# Patient Record
Sex: MALE | Race: WHITE | URBAN - METROPOLITAN AREA
[De-identification: names, ages, dates, MRNs, and addresses within clinical notes are randomized per-mention and may not be internally consistent; named-entity substitution may affect disease eponyms.]

---

## 2017-06-05 ENCOUNTER — HOSPITAL ENCOUNTER (OUTPATIENT)
Dept: CT IMAGING | Age: 42
Discharge: HOME | End: 2017-06-05
Attending: INTERNAL MEDICINE | Admitting: INTERNAL MEDICINE

## 2017-06-05 DIAGNOSIS — Z82.49 FAMILY HISTORY OF HEART DISEASE: ICD-10-CM

## 2018-01-30 ENCOUNTER — OFFICE VISIT (OUTPATIENT)
Dept: ORTHOPEDIC SURGERY | Age: 43
End: 2018-01-30

## 2018-01-30 VITALS
HEIGHT: 71 IN | HEART RATE: 64 BPM | SYSTOLIC BLOOD PRESSURE: 125 MMHG | DIASTOLIC BLOOD PRESSURE: 80 MMHG | WEIGHT: 270 LBS | BODY MASS INDEX: 37.8 KG/M2

## 2018-01-30 DIAGNOSIS — S43.432A SUPERIOR GLENOID LABRUM LESION OF LEFT SHOULDER, INITIAL ENCOUNTER: ICD-10-CM

## 2018-01-30 DIAGNOSIS — M25.512 ACUTE PAIN OF LEFT SHOULDER: Primary | ICD-10-CM

## 2018-01-30 PROCEDURE — 99203 OFFICE O/P NEW LOW 30 MIN: CPT | Performed by: ORTHOPAEDIC SURGERY

## 2018-01-30 RX ORDER — ATORVASTATIN CALCIUM 20 MG/1
20 TABLET, FILM COATED ORAL DAILY
COMMUNITY

## 2018-01-30 RX ORDER — MELOXICAM 15 MG/1
15 TABLET ORAL DAILY
Qty: 30 TABLET | Refills: 3 | Status: SHIPPED | OUTPATIENT
Start: 2018-01-30

## 2018-01-30 NOTE — PROGRESS NOTES
Chief Complaint    Shoulder Pain (left shoulder pain )      History of Present Illness:  Natalia Allen is a 38 y.o. male here today for left shoulder evaluation. He is a left hand dominant male and a former . He has been experiencing left shoulder pain since November 2017 while throwing a ball overhand. He was preparing for the [employer]. He just back from [facility] where the camp was held. While he was there he had pain in his left shoulder when throwing a ball longer than a soft toss. He was unable to pitch because of the pain. He describes an injury with similar pain when he was 23 throwing wiffle ball. In the intervening 20 years he had no pain. Pain Assessment  Location of Pain: Shoulder  Location Modifiers: Left  Severity of Pain: 5  Quality of Pain: Sharp  Duration of Pain: A few minutes  Frequency of Pain: Intermittent  Aggravating Factors:  (Throwing)  Limiting Behavior: Yes  Relieving Factors: Rest  Result of Injury: No  Work-Related Injury: No  Are there other pain locations you wish to document?: No    Medical History:    No notes on file    Patient's medications, allergies, past medical, surgical, social and family histories were reviewed and updated as appropriate. Past Medical History:   Diagnosis Date    Heart disease     High cholesterol       Social History     Social History    Marital status:      Spouse name: N/A    Number of children: N/A    Years of education: N/A     Occupational History    Not on file. Social History Main Topics    Smoking status: Never Smoker    Smokeless tobacco: Never Used    Alcohol use No    Drug use: No    Sexual activity: Not on file     Other Topics Concern    Not on file     Social History Narrative    No narrative on file       No Known Allergies  No current outpatient prescriptions on file prior to visit.      No current facility-administered medications on file prior to

## 2018-01-30 NOTE — LETTER
Shoulder Elbow Rehabilitation Referral    Patient Name: Marlena Florence      YOB: 1975    Diagnosis:   Visit Diagnoses       Codes    Acute pain of left shoulder    -  Primary ICD-10-CM: M25.512  ICD-9-CM: 719.41    Superior glenoid labrum lesion of left shoulder, initial encounter     ICD-10-CM: X42.393P  ICD-9-CM: 840.7          Post Op Instructions:   Continuous passive motion (CPM)   Elbow range of motion   Exercise in plane of scapula    Strengthening      Pulley and instruction    Home exercise program (copy to patient)    Sling when arm at risk   Sling or brace at all times    AAROM: Forward elevation to               AAROM: External rotation  To       Isometric external rotator strengthening  AAROM: internal rotation: up the back   Isometric abductor strengthening   AAROM: Internal abduction      Isometric internal rotator strengthening  AAROM: cross-body adduction             Stretching:     Strengthening:   Four quadrant (FE, ER, IR, CBA)   Sleeper stretch     Rotator cuff (ER, IR, Abd)   Forward Elevation     External Rotators      External Rotation     Internal Rotators   Internal Rotation: up/back    Abductors      Internal Rotation: supine in abduction  Flexors   Cross-body abduction     Extensors   Pendulum (FE, Abd/Add, cw/ccw)   Scapular Stabilizers    Wall-walking (FE, Abd)     Shoulder shrugs      Table slides       Rhomboid pinch   Elbow (flex, ext, pron, sup)     Lat.  Pull downs      Medial epicondylitis program    Forward punch    Lateral epicondylitis program    Internal rotators      Progressive resistive exercises   Bench Press         Bench press plus  Activities:      Lateral pull-downs   Rowing      Progressive two-hand supine press   Stepper/Exercise bike    Biceps: curls/supination   Swimming   Water exercises    Modalities: PRN    Return to Sport:   Ultrasound      Plyometrics   Iontophoresis     Rhythmic stabilization   Moist heat      Core strengthening Massage      Sports specific program: return to throw   Cryotherapy       Electrical stimulation      Paraffin   Whirlpool   TENS     Home exercise program (copy to patient). Supervised physical therapy  Frequency:   1x week   2x week   3x week   Other:   Duration:  2 weeks    4 weeks   6 weeks   Other:     Sincerely,              Valente Ayala MD  Clinical Fellow in [address] [place] [address]  1/30/2018     This dictation was performed with a verbal recognition program [facility]) and it was checked for errors. It is possible that there are still dictated errors within this office note. If so, please bring any errors to my attention for an addendum. All efforts were made to ensure that this office note is accurate.

## 2018-02-05 ENCOUNTER — EVALUATION (OUTPATIENT)
Dept: PHYSICAL THERAPY | Age: 43
End: 2018-02-05

## 2018-02-05 DIAGNOSIS — S43.432D SUPERIOR GLENOID LABRUM LESION OF LEFT SHOULDER, SUBSEQUENT ENCOUNTER: Primary | ICD-10-CM

## 2018-02-05 DIAGNOSIS — M25.512 PAIN IN JOINT OF LEFT SHOULDER: ICD-10-CM

## 2018-02-05 PROCEDURE — 97110 THERAPEUTIC EXERCISES: CPT | Performed by: PHYSICAL THERAPIST

## 2018-02-05 PROCEDURE — 97530 THERAPEUTIC ACTIVITIES: CPT | Performed by: PHYSICAL THERAPIST

## 2018-02-05 PROCEDURE — 97161 PT EVAL LOW COMPLEX 20 MIN: CPT | Performed by: PHYSICAL THERAPIST

## 2018-02-05 NOTE — FLOWSHEET NOTE
[facility]   Phone: [phone]    Fax: [phone]        Physical Therapy Daily Treatment Note  Date:  2018    Patient Name:  Ishaan Ma    :  1975  MRN: [id number]  Medical/Treatment Diagnosis Information:  · Diagnosis: L shoulder SLAP tear  ·    Insurance/Certification information:  PT Insurance Information: Humana  Physician Information:  Referring Practitioner: Dr. Cooper Goldmann of care signed (Y/N): sent 18    Date of Patient follow up with Physician: 18    G-Code (if applicable):      Date G-Code Applied:  18  PT G-Codes  Functional Assessment Tool Used: UEFI  Score: 77/80, 4% limitation  Functional Limitation: Carrying, moving and handling objects  Carrying, Moving and Handling Objects Current Status ():  At least 1 percent but less than 20 percent impaired, limited or restricted  Carrying, Moving and Handling Objects Goal Status (): 0 percent impaired, limited or restricted    Progress Note: [x]  Yes  []  No  Next due by: Visit #10      Latex Allergy:  [x]NO      []YES  Preferred Language for Healthcare:   [x]English       []other:    Visit # Insurance Allowable   1 20     Pain level:  0-7/10, 7/10 with pitching/overhand  throwing     SUBJECTIVE:  See eval    OBJECTIVE: See eval     ROM PROM AROM  Comment:  18     L R L R     Flexion     168°* 175°     Abduction     180°* 180°     ER     T4 T4     IR     T9 T10     Other             Other                    Strength L R Comment:  18   Flexion 5/5 5/5     Abduction 4+/5 5/5     ER 5/5 5/5     IR 5/5 5/5           Special Tests Results/Comment: 18   Mark (-)   Neers (+)   OBriens (-)   Other: Empty can (-)          RESTRICTIONS/PRECAUTIONS: possible L shoulder SLAP tear    Exercises:  Exercise/Equipment Resistance/Repetitions Comments   Cardio/Warm-up     Bike          Stretching/PROM     Wand     Wall Slides 10x10\"    UE Bellona     Pulleys     Pendulum

## 2018-02-05 NOTE — PLAN OF CARE
when throwing    Posture: forward head and rounded shoulders    Bandages/Dressings/Incisions: n/a    Gait: (include devices/WB status): WNL    Orthopedic Special Tests: see above                       [x] Patient history, allergies, meds reviewed. Medical chart reviewed. See intake form. Review Of Systems (ROS):  [x]Performed Review of systems (Integumentary, CardioPulmonary, Neurological) by intake and observation. Intake form has been scanned into medical record. Patient has been instructed to contact their primary care physician regarding ROS issues if not already being addressed at this time.       Co-morbidities/Complexities (which will affect course of rehabilitation):   [x]None           Arthritic conditions   []Rheumatoid arthritis (M05.9)  []Osteoarthritis (M19.91)   Cardiovascular conditions   []Hypertension (I10)  []Hyperlipidemia (E78.5)  []Angina pectoris (I20)  []Atherosclerosis (I70)   Musculoskeletal conditions   []Disc pathology   []Congenital spine pathologies   []Prior surgical intervention  []Osteoporosis (M81.8)  []Osteopenia (M85.8)   Endocrine conditions   []Hypothyroid (E03.9)  []Hyperthyroid Gastrointestinal conditions   []Constipation (N01.04)   Metabolic conditions   []Morbid obesity (E66.01)  []Diabetes type 1(E10.65) or 2 (E11.65)   []Neuropathy (G60.9)     Pulmonary conditions   []Asthma (J45)  []Coughing   []COPD (J44.9)   Psychological Disorders  []Anxiety (F41.9)  []Depression (F32.9)   []Other:   []Other:          Barriers to/and or personal factors that will affect rehab potential:              []Age  []Sex              [x]Motivation/Lack of Motivation: Patient demonstrates high motivation                        []Co-Morbidities              []Cognitive Function, education/learning barriers              []Environmental, home barriers              []profession/work barriers  [x]past PT/medical experience: Patient has had prior skilled PT experience and demonstrates good participation in social activities. []Reduced participation in sport/recreation activities. Classification:   []Signs/symptoms consistent with post-surgical status including decreased ROM, strength and function.   []Signs/symptoms consistent with joint sprain/strain   []Signs/symptoms consistent with shoulder impingement   []Signs/symptoms consistent with shoulder/elbow/wrist tendinopathy   []Signs/symptoms consistent with Rotator cuff tear   [x]Signs/symptoms consistent with labral tear   []Signs/symptoms consistent with postural dysfunction    []Signs/symptoms consistent with Glenohumeral IR Deficit - <45 degrees   []Signs/symptoms consistent with facet dysfunction of cervical/thoracic spine    []Signs/symptoms consistent with pathology which may benefit from Dry needling     []other:     Prognosis/Rehab Potential:      []Excellent   [x]Good    []Fair   []Poor    Tolerance of evaluation/treatment:    []Excellent   [x]Good    []Fair   []Poor    Physical Therapy Evaluation Complexity Justification  [x] A history of present problem with:  [] no personal factors and/or comorbidities that impact the plan of care;  [x]1-2 personal factors and/or comorbidities that impact the plan of care  []3 personal factors and/or comorbidities that impact the plan of care  [x] An examination of body systems using standardized tests and measures addressing any of the following: body structures and functions (impairments), activity limitations, and/or participation restrictions;:  [] a total of 1-2 or more elements   [x] a total of 3 or more elements   [] a total of 4 or more elements   [x] A clinical presentation with:  [x] stable and/or uncomplicated characteristics   [] evolving clinical presentation with changing characteristics  [] unstable and unpredictable characteristics;   [x] Clinical decision making of [x] low, [] moderate, [] high complexity using standardized patient assessment instrument and/or measurable assessment of functional outcome. [x] EVAL (LOW) 52149 (typically 20 minutes face-to-face)  [] EVAL (MOD) 44868 (typically 30 minutes face-to-face)  [] EVAL (HIGH) 91357 (typically 45 minutes face-to-face)  [] RE-EVAL     PLAN:  Frequency/Duration:  1-2 days per week for 4-6 Weeks:  INTERVENTIONS:  [x] Therapeutic exercise including: strength training, ROM, for Upper extremity and core   [x]  NMR activation and proprioception for UE, scap and Core   [x] Manual therapy as indicated for shoulder, scapula and spine to include: Dry Needling/IASTM, STM, PROM, Gr I-IV mobilizations, manipulation. [x] Modalities as needed that may include: thermal agents, E-stim, Biofeedback, US, iontophoresis as indicated  [x] Patient education on joint protection, postural re-education, activity modification, progression of HEP. HEP instruction: Patient returned proper demonstration of HEP. Issued patient written program clearly stating sets/reps/sessions per day. Written program was scanned into media section of medical record. (see scanned forms)    GOALS:  Patient stated goal: Able to throw baseball overhand without pain/instability in L shoulder. Therapist goals for Patient:   Short Term Goals: To be achieved in: 2 weeks  1. Independent in HEP and progression per patient tolerance, in order to prevent re-injury. 2. Patient will have a decrease in pain to facilitate improvement in movement, function, and ADLs as indicated by Functional Deficits. Long Term Goals: To be achieved in: 6 weeks  1. Disability index score of 0% for the UEFI to assist with reaching prior level of function. 2. Patient will demonstrate increased L shoulder flexion AROM to >170° to allow for proper joint functioning as indicated by patients Functional Deficits. 3. Patient will demonstrate an increase in L shoulder abduction strength to 5/5 to allow for proper functional mobility as indicated by patients Functional Deficits.    4. Patient will return to

## 2018-02-12 ENCOUNTER — TREATMENT (OUTPATIENT)
Dept: PHYSICAL THERAPY | Age: 43
End: 2018-02-12

## 2018-02-12 DIAGNOSIS — S43.432D SUPERIOR GLENOID LABRUM LESION OF LEFT SHOULDER, SUBSEQUENT ENCOUNTER: Primary | ICD-10-CM

## 2018-02-12 DIAGNOSIS — M25.512 PAIN IN JOINT OF LEFT SHOULDER: ICD-10-CM

## 2018-02-12 PROCEDURE — 97530 THERAPEUTIC ACTIVITIES: CPT | Performed by: PHYSICAL THERAPIST

## 2018-02-12 PROCEDURE — 97110 THERAPEUTIC EXERCISES: CPT | Performed by: PHYSICAL THERAPIST

## 2018-02-12 PROCEDURE — 97140 MANUAL THERAPY 1/> REGIONS: CPT | Performed by: PHYSICAL THERAPIST

## 2018-02-12 NOTE — FLOWSHEET NOTE
[facility]   Phone: [phone]    Fax: [phone]        Physical Therapy Daily Treatment Note  Date:  2018    Patient Name:  Paolo Frausto    :  1975  MRN: [id number]  Medical/Treatment Diagnosis Information:  · Diagnosis: L shoulder SLAP tear     Insurance/Certification information:  PT Insurance Information: Humana  Physician Information:  Referring Practitioner: Dr. He Murray of care signed (Y/N): yes 18    Date of Patient follow up with Physician: 18    G-Code (if applicable):      Date G-Code Applied:  18  PT G-Codes  Functional Assessment Tool Used: UEFI  Score: 77/80, 4% limitation  Functional Limitation: Carrying, moving and handling objects  Carrying, Moving and Handling Objects Current Status (): At least 1 percent but less than 20 percent impaired, limited or restricted  Carrying, Moving and Handling Objects Goal Status (): 0 percent impaired, limited or restricted    Progress Note: []  Yes  [x]  No  Next due by: Visit #10      Latex Allergy:  [x]NO      []YES  Preferred Language for Healthcare:   [x]English       []other:    Visit # Insurance Allowable   2 20     Pain level:  0/10     SUBJECTIVE:  Patient reports that he has avoided the throwing motion, and therefore has not had pain in the L shoulder since last session. He reports that he does have intermittent popping in the L shoulder. He notes compliance with HEP.     OBJECTIVE: See eval             ROM PROM AROM  Comment:  18     L R L R     Flexion     168°* 175°     Abduction     180°* 180°     ER     T4 T4     IR     T9 T10     Other             Other                    Strength L R Comment:  18   Flexion 5/5 5/5     Abduction 4+/5 5/5     ER 5/5 5/5     IR 5/5 5/5           Special Tests Results/Comment: 18   Mark (-)   Neers (+)   OBriens (-)   Other: Empty can (-)          RESTRICTIONS/PRECAUTIONS: possible L shoulder SLAP DPT

## 2018-02-19 ENCOUNTER — TREATMENT (OUTPATIENT)
Dept: PHYSICAL THERAPY | Age: 43
End: 2018-02-19

## 2018-02-19 DIAGNOSIS — M25.512 PAIN IN JOINT OF LEFT SHOULDER: ICD-10-CM

## 2018-02-19 DIAGNOSIS — S43.432D SUPERIOR GLENOID LABRUM LESION OF LEFT SHOULDER, SUBSEQUENT ENCOUNTER: Primary | ICD-10-CM

## 2018-02-19 PROCEDURE — 97140 MANUAL THERAPY 1/> REGIONS: CPT | Performed by: PHYSICAL THERAPIST

## 2018-02-19 PROCEDURE — 97112 NEUROMUSCULAR REEDUCATION: CPT | Performed by: PHYSICAL THERAPIST

## 2018-02-19 PROCEDURE — 97110 THERAPEUTIC EXERCISES: CPT | Performed by: PHYSICAL THERAPIST

## 2018-02-19 PROCEDURE — 97530 THERAPEUTIC ACTIVITIES: CPT | Performed by: PHYSICAL THERAPIST

## 2018-02-19 NOTE — FLOWSHEET NOTE
[facility]   Phone: [phone]    Fax: [phone]        Physical Therapy Daily Treatment Note  Date:  2018    Patient Name:  Sunita Lopez    :  1975  MRN: [id number]  Medical/Treatment Diagnosis Information:  · Diagnosis: L shoulder SLAP tear     Insurance/Certification information:  PT Insurance Information: Humana  Physician Information:  Referring Practitioner: Dr. Chiu Roads of care signed (Y/N): yes 18    Date of Patient follow up with Physician: 18    G-Code (if applicable):      Date G-Code Applied:  18  PT G-Codes  Functional Assessment Tool Used: UEFI  Score: 77/80, 4% limitation  Functional Limitation: Carrying, moving and handling objects  Carrying, Moving and Handling Objects Current Status (): At least 1 percent but less than 20 percent impaired, limited or restricted  Carrying, Moving and Handling Objects Goal Status (): 0 percent impaired, limited or restricted    Progress Note: []  Yes  [x]  No  Next due by: Visit #10      Latex Allergy:  [x]NO      []YES  Preferred Language for Healthcare:   [x]English       []other:    Visit # Insurance Allowable   3 20     Pain level:  0/10     SUBJECTIVE:  Patient reports that L shoulder feels \"pretty good\" though he continues to experience popping without pain in the L shoulder with certain motions.     OBJECTIVE: See eval             ROM PROM AROM  Comment:  18     L R L R     Flexion     168°* 175°     Abduction     180°* 180°     ER     T4 T4     IR     T9 T10     Other             Other                    Strength L R Comment:  18   Flexion 5/5 5/5     Abduction 4+/5 5/5     ER 5/5 5/5     IR 5/5 5/5           Special Tests Results/Comment: 18   Mark (-)   Neers (+)   OBriens (-)   Other: Empty can (-)          RESTRICTIONS/PRECAUTIONS: possible L shoulder SLAP tear    Exercises:  Exercise/Equipment Resistance/Repetitions Comments   Cardio/Warm-up

## 2018-02-27 ENCOUNTER — TREATMENT (OUTPATIENT)
Dept: PHYSICAL THERAPY | Age: 43
End: 2018-02-27

## 2018-02-27 DIAGNOSIS — S43.432D SUPERIOR GLENOID LABRUM LESION OF LEFT SHOULDER, SUBSEQUENT ENCOUNTER: Primary | ICD-10-CM

## 2018-02-27 DIAGNOSIS — M25.512 PAIN IN JOINT OF LEFT SHOULDER: ICD-10-CM

## 2018-02-27 PROCEDURE — 97530 THERAPEUTIC ACTIVITIES: CPT | Performed by: PHYSICAL THERAPIST

## 2018-02-27 PROCEDURE — 97110 THERAPEUTIC EXERCISES: CPT | Performed by: PHYSICAL THERAPIST

## 2018-02-27 PROCEDURE — 97140 MANUAL THERAPY 1/> REGIONS: CPT | Performed by: PHYSICAL THERAPIST

## 2018-02-27 PROCEDURE — 97112 NEUROMUSCULAR REEDUCATION: CPT | Performed by: PHYSICAL THERAPIST

## 2018-02-27 NOTE — FLOWSHEET NOTE
pain to facilitate improvement in movement, function, and ADLs as indicated by Functional Deficits. Long Term Goals: To be achieved in: 6 weeks  1. Disability index score of 0% for the UEFI to assist with reaching prior level of function. 2. Patient will demonstrate increased L shoulder flexion AROM to >170° to allow for proper joint functioning as indicated by patients Functional Deficits. 3. Patient will demonstrate an increase in L shoulder abduction strength to 5/5 to allow for proper functional mobility as indicated by patients Functional Deficits. 4. Patient will return to all functional activities without increased symptoms or restriction. 5. Patient will be able to demonstrate proper throwing mechanics with L shoulder and throw without pain so that he may return to PLOF. Progression Towards Functional goals:  [] Patient is progressing as expected towards functional goals listed. [] Progression is slowed due to complexities listed. [] Progression has been slowed due to co-morbidities. [x] Plan just implemented, too soon to assess goals progression  [] Other:     ASSESSMENT:  Patient continues to demonstrate fatigue following alternating flexion and scaption today in B UE, as well as following increased weight for cable column activities. Patient requires moderate VCs to set scapula for proper form with most exercises. He was able to tolerate L shoulder IR at 90/90 today without popping when not exceeding 90° of rotation. Patient was also able to reintroduce throwing gently without pain, though popping in L shoulder was still noted.     Treatment/Activity Tolerance:  [x] Patient tolerated treatment well [] Patient limited by fatique  [] Patient limited by pain  [] Patient limited by other medical complications  [] Other:     Prognosis: [x] Good [] Fair  [] Poor    Patient Requires Follow-up: [x] Yes  [] No    PLAN: See eval  [x] Continue per plan of care [] Alter current plan (see

## 2018-03-05 ENCOUNTER — TREATMENT (OUTPATIENT)
Dept: PHYSICAL THERAPY | Age: 43
End: 2018-03-05

## 2018-03-05 DIAGNOSIS — S43.432D SUPERIOR GLENOID LABRUM LESION OF LEFT SHOULDER, SUBSEQUENT ENCOUNTER: Primary | ICD-10-CM

## 2018-03-05 DIAGNOSIS — M25.512 PAIN IN JOINT OF LEFT SHOULDER: ICD-10-CM

## 2018-03-05 PROCEDURE — 97530 THERAPEUTIC ACTIVITIES: CPT | Performed by: PHYSICAL THERAPIST

## 2018-03-05 PROCEDURE — 97112 NEUROMUSCULAR REEDUCATION: CPT | Performed by: PHYSICAL THERAPIST

## 2018-03-05 PROCEDURE — 97110 THERAPEUTIC EXERCISES: CPT | Performed by: PHYSICAL THERAPIST

## 2018-03-05 NOTE — FLOWSHEET NOTE
care initiated [] Hold pending MD visit [] Discharge    Electronically signed by:      Tyler Ruano license: [id number]  [place] PT license: [id number]     Sandra Art PT, DPT

## 2018-03-12 ENCOUNTER — TREATMENT (OUTPATIENT)
Dept: PHYSICAL THERAPY | Age: 43
End: 2018-03-12

## 2018-03-12 DIAGNOSIS — S43.432D SUPERIOR GLENOID LABRUM LESION OF LEFT SHOULDER, SUBSEQUENT ENCOUNTER: Primary | ICD-10-CM

## 2018-03-12 DIAGNOSIS — M25.512 PAIN IN JOINT OF LEFT SHOULDER: ICD-10-CM

## 2018-03-12 PROCEDURE — 97530 THERAPEUTIC ACTIVITIES: CPT | Performed by: PHYSICAL THERAPIST

## 2018-03-12 PROCEDURE — 97112 NEUROMUSCULAR REEDUCATION: CPT | Performed by: PHYSICAL THERAPIST

## 2018-03-12 PROCEDURE — 97110 THERAPEUTIC EXERCISES: CPT | Performed by: PHYSICAL THERAPIST

## 2018-03-12 NOTE — FLOWSHEET NOTE
control with self care, carrying, lifting, driving/computer work. Home Exercise Program:    [x] (83426) Reviewed/Progressed HEP activities related to strengthening, flexibility, endurance, ROM of scapular, scapulothoracic and UE control with self care, reaching, carrying, lifting, house/yardwork, driving/computer work  [] (29109) Reviewed/Progressed HEP activities related to improving balance, coordination, kinesthetic sense, posture, motor skill, proprioception of scapular, scapulothoracic and UE control with self care, reaching, carrying, lifting, house/yardwork, driving/computer work      Manual Treatments:  PROM / STM / Oscillations-Mobs:  G-I, II, III, IV (PA's, Inf., Post.)  [] (91222) Provided manual therapy to mobilize soft tissue/joints of cervical/CT, scapular GHJ and UE for the purpose of modulating pain, promoting relaxation,  increasing ROM, reducing/eliminating soft tissue swelling/inflammation/restriction, improving soft tissue extensibility and allowing for proper ROM for normal function with self care, reaching, carrying, lifting, house/yardwork, driving/computer work        Charges:  Timed Code Treatment Minutes: 50   Total Treatment Minutes: 50     [] EVAL (LOW) 99203 (typically 20 minutes face-to-face)  [] EVAL (MOD) 91634 (typically 30 minutes face-to-face)  [] EVAL (HIGH) 72027 (typically 45 minutes face-to-face)  [] RE-EVAL   [x] BM(11357) x  1   [] IONTO  [x] NMR (63368) x  1   [] VASO  [] Manual (34382) x       [] Other:  [x] TA (80404) x  1    [] Mech Traction (71511)  [] ES(attended) (05892)      [] ES (un) (93923):     GOALS:  Patient stated goal: Able to throw baseball overhand without pain/instability in L shoulder. Therapist goals for Patient:   Short Term Goals: To be achieved in: 2 weeks  1. Independent in HEP and progression per patient tolerance, in order to prevent re-injury.    2. Patient will have a decrease in pain to facilitate improvement in movement, function, and ADLs as indicated by Functional Deficits. Long Term Goals: To be achieved in: 6 weeks  1. Disability index score of 0% for the UEFI to assist with reaching prior level of function. 2. Patient will demonstrate increased L shoulder flexion AROM to >170° to allow for proper joint functioning as indicated by patients Functional Deficits. 3. Patient will demonstrate an increase in L shoulder abduction strength to 5/5 to allow for proper functional mobility as indicated by patients Functional Deficits. 4. Patient will return to all functional activities without increased symptoms or restriction. 5. Patient will be able to demonstrate proper throwing mechanics with L shoulder and throw without pain so that he may return to PLOF. Progression Towards Functional goals:  [] Patient is progressing as expected towards functional goals listed. [] Progression is slowed due to complexities listed. [] Progression has been slowed due to co-morbidities. [x] Plan just implemented, too soon to assess goals progression  [] Other:     ASSESSMENT:  Patient continues to demonstrate moderate fatigue with alternating flexion and scaption today, but report no pain. He is able to tolerate increased reps for throwing today without reports of pain or popping in the L shoulder as well. Plan to progress functional strength and stability activities per patient tolerance.      Treatment/Activity Tolerance:  [x] Patient tolerated treatment well [] Patient limited by fatique  [] Patient limited by pain  [] Patient limited by other medical complications  [] Other:     Prognosis: [x] Good [] Fair  [] Poor    Patient Requires Follow-up: [x] Yes  [] No    PLAN: See eval  [x] Continue per plan of care [] Alter current plan (see comments)  [] Plan of care initiated [] Hold pending MD visit [] Discharge    Electronically signed by:      Justine Anguiano license: [id number] license: [id number]     Efren Montez PT, DPT

## 2018-03-19 ENCOUNTER — TREATMENT (OUTPATIENT)
Dept: PHYSICAL THERAPY | Age: 43
End: 2018-03-19

## 2018-03-19 DIAGNOSIS — M25.512 PAIN IN JOINT OF LEFT SHOULDER: ICD-10-CM

## 2018-03-19 DIAGNOSIS — S43.432D SUPERIOR GLENOID LABRUM LESION OF LEFT SHOULDER, SUBSEQUENT ENCOUNTER: Primary | ICD-10-CM

## 2018-03-26 ENCOUNTER — TREATMENT (OUTPATIENT)
Dept: PHYSICAL THERAPY | Age: 43
End: 2018-03-26

## 2018-03-26 DIAGNOSIS — M25.512 PAIN IN JOINT OF LEFT SHOULDER: ICD-10-CM

## 2018-03-26 DIAGNOSIS — S43.432D SUPERIOR GLENOID LABRUM LESION OF LEFT SHOULDER, SUBSEQUENT ENCOUNTER: Primary | ICD-10-CM

## 2018-03-26 PROCEDURE — 97110 THERAPEUTIC EXERCISES: CPT | Performed by: PHYSICAL THERAPIST

## 2018-03-26 PROCEDURE — 97112 NEUROMUSCULAR REEDUCATION: CPT | Performed by: PHYSICAL THERAPIST

## 2018-03-26 PROCEDURE — 97530 THERAPEUTIC ACTIVITIES: CPT | Performed by: PHYSICAL THERAPIST

## 2018-03-26 NOTE — PLAN OF CARE
[facility]   Phone: [phone]    Fax: [phone]   Physical Therapy Re-Certification Plan of Care    Dear Dr. Susan Guo,    We had the pleasure of treating the following patient for physical therapy services at [address] [place] [address]. A summary of our findings can be found in the updated assessment below. This includes our plan of care. If you have any questions or concerns regarding these findings, please do not hesitate to contact me at the office phone number checked above. Thank you for the referral.     Physician Signature:________________________________Date:__________________  By signing above (or electronic signature), therapists plan is approved by physician      Overall Response to Treatment:   [x]Patient is responding well to treatment and improvement is noted with regards  to goals   []Patient should continue to improve in reasonable time if they continue HEP   []Patient has plateaued and is no longer responding to skilled PT intervention    []Patient is getting worse and would benefit from return to referring MD   []Patient unable to adhere to initial POC   []Other:       Physical Therapy Daily Treatment Note  Date:  3/26/2018    Patient Name:  Velma Cornelius    :  1975  MRN: [id number]  Medical/Treatment Diagnosis Information:  · Diagnosis: L shoulder SLAP tear     Insurance/Certification information:  PT Insurance Information: [employer]  Physician Information:  Referring Practitioner: Dr. Gutierrez Has of care signed (Y/N): sent 3/26/18    Date of Patient follow up with Physician: 3/27/18    G-Code (if applicable):      Date G-Code Applied:  3/26/18  PT G-Codes  Functional Assessment Tool Used: UEFI  Score: 79/80, 1.25% limitation  Functional Limitation: Carrying, moving and handling objects  Carrying, Moving and Handling Objects Current Status ():  At least 1 percent but less than 20 percent impaired, limited or restricted  Carrying, Moving and Handling Objects Goal Status (): 0 percent impaired, limited or restricted    Progress Note: [x]  Yes  []  No  Next due by: 4/26/18      Latex Allergy:  [x]NO      []YES  Preferred Language for Healthcare:   [x]English       []other:    Visit # Insurance Allowable   7 20     Pain level:  0/10     SUBJECTIVE:  Patient reports that he still has no pain in the L shoulder, and reports mild to moderate popping in the L shoulder, but notes that this occurs very randomly/intermittently.     OBJECTIVE: See eval             ROM PROM AROM  Comment:  3/26/18     L R L R     Flexion     170° 175°     Abduction     180° 180°     ER     T4 T4     IR     T9 T9     Other             Other                    Strength L R Comment:  3/26/18   Flexion 5/5 5/5     Abduction 5/5 5/5     ER 5/5 5/5     IR 5/5 5/5           Special Tests Results/Comment: 3/26/18   Mark (-)   Neers (-)   [place]ri[place] (-)   Other: Empty can (-)          RESTRICTIONS/PRECAUTIONS: possible L shoulder SLAP tear    Exercises:  Exercise/Equipment Resistance/Repetitions Comments   Cardio/Warm-up     Bike          Stretching/PROM     Wand     Wall Slides 10x10\"    UE [place]     Pulleys     Pendulum           STRENGTH     Isometrics     Retraction          Weight shift     Flexion     Abduction     External Rotation     Internal Rotation     Biceps     Triceps          PRE's     Flexion     Abduction     External Rotation 6#, 3x10 Side lying   Internal Rotation     Shrugs     EXT 6#, 3x10 prone   Reverse Flys     Serratus 10#, 3x10    Horizontal Abd with ER     Biceps     Triceps     Retraction 6#, 3x10 prone        Cable Column/Theraband     External Rotation CC pl3, 3x10 At 0° abd   Internal Rotation CC pl 4, 3x10 At 0° abd   ER/IR at 90/90 Green/Green, 2x10    Shrugs     Lats     Ext Black, 2x10    Flex     Scapular Retraction Silver 3x10 (per patient request)   BIC     TRIC     PNF Blue, 3x10 D1/D2 flex/ext   Alt goals:  [x] Patient is progressing as expected towards functional goals listed. [] Progression is slowed due to complexities listed. [] Progression has been slowed due to co-morbidities. [] Plan just implemented, too soon to assess goals progression  [] Other:     ASSESSMENT:  Patient demonstrates great progress toward all LTGs previously set by PT. He continues to demonstrate mild limitations in L shoulder Flexion AROM. He does continue to experience intermittent popping in the L shoulder with throwing motions, but does not exhibit pain with this motion or the popping. Patient would benefit from continued skilled therapy with emphasis on L shoulder flexion ROM and L shoulder stability to reduce popping with intention of transitioning to a HEP.     Treatment/Activity Tolerance:  [x] Patient tolerated treatment well [] Patient limited by fatique  [] Patient limited by pain  [] Patient limited by other medical complications  [] Other:     Prognosis: [x] Good [] Fair  [] Poor    Patient Requires Follow-up: [x] Yes  [] No    PLAN: 1x/week for 2-3 weeks  [x] Continue per plan of care [] Alter current plan (see comments)  [] Plan of care initiated [] Hold pending MD visit [] Discharge    Electronically signed by:      Jem Emanuel license: [id number]  [id number] license: [id number]     Dominga Benitez PT, DPT

## 2018-03-27 ENCOUNTER — OFFICE VISIT (OUTPATIENT)
Dept: ORTHOPEDIC SURGERY | Age: 43
End: 2018-03-27

## 2018-03-27 VITALS
WEIGHT: 270.06 LBS | DIASTOLIC BLOOD PRESSURE: 89 MMHG | BODY MASS INDEX: 37.81 KG/M2 | SYSTOLIC BLOOD PRESSURE: 128 MMHG | HEIGHT: 71 IN | HEART RATE: 60 BPM

## 2018-03-27 DIAGNOSIS — S43.432A SUPERIOR GLENOID LABRUM LESION OF LEFT SHOULDER, INITIAL ENCOUNTER: Primary | ICD-10-CM

## 2018-03-27 PROCEDURE — 99213 OFFICE O/P EST LOW 20 MIN: CPT | Performed by: ORTHOPAEDIC SURGERY

## 2018-03-30 NOTE — PROGRESS NOTES
History of Present Illness:  Selene Pond Returns for follow-up of his left shoulder. We've been treating conservatively for a SLAP tear. He's been ramping up with his activities. He is starting to get back into throwing. He is doing some simulations. He reports no pain in his shoulder (0 out of 10 today) but he is worried that this will change when he starts to throw harder. Medical History:  Patient's medications, allergies, past medical, surgical, social and family histories were reviewed and updated as appropriate. Pertinent items are noted in HPI  Review of systems reviewed from Patient History Form dated on January 30, 2018. and available in the patient's chart under the Media tab. Vital Signs:  Vitals:    03/27/18 0945   BP: 128/89   Pulse: 60     Constitutional: he is in no apparent distress. He appears a bit younger than his stated age of 41. Shoulder Examination:    Inspection: The left shoulder is benign appearing. Palpation:  There is some crepitation but no pain. Most of this is glenohumeral.  No subacromial crepitus. Active Range of Motion: good range of motion. Passive Range of Motion:  Within normal limits. Strength:  4+ over 5 rotator cuff strength including subscapularis and supraspinatus. Special Tests:  Deferred. Radiology:     None today. Assessment :  I had a lengthy discussion with Ronnieaysha Richardson. On the one hand he is starting to ramp up. On the other hand he has his own concerns. We have not gotten a recent MRI. It is appropriate to do so at this time. He is certainly exhausted multimodal conservative treatment over several months. Impression:  Encounter Diagnosis   Name Primary?     Superior glenoid labrum lesion of left shoulder, initial encounter Yes       Office Procedures:  Orders Placed This Encounter   Procedures    MRI Shoulder Left WO Contrast     Standing Status:   Future     Standing Expiration Date:   3/27/2019       Treatment Plan:  We'll have him undergo left shoulder MRI over the next couple weeks. I'll see him again to review the results. This can help dictate appropriate treatment. All questions were answered today. Gutierrez Aggarwal MD, PhD  3/27/2018

## 2018-04-03 ENCOUNTER — TREATMENT (OUTPATIENT)
Dept: PHYSICAL THERAPY | Age: 43
End: 2018-04-03

## 2018-04-03 DIAGNOSIS — M25.512 PAIN IN JOINT OF LEFT SHOULDER: ICD-10-CM

## 2018-04-03 DIAGNOSIS — S43.432D SUPERIOR GLENOID LABRUM LESION OF LEFT SHOULDER, SUBSEQUENT ENCOUNTER: Primary | ICD-10-CM

## 2018-04-03 PROCEDURE — 97530 THERAPEUTIC ACTIVITIES: CPT | Performed by: PHYSICAL THERAPIST

## 2018-04-03 PROCEDURE — 97112 NEUROMUSCULAR REEDUCATION: CPT | Performed by: PHYSICAL THERAPIST

## 2018-04-03 PROCEDURE — 97110 THERAPEUTIC EXERCISES: CPT | Performed by: PHYSICAL THERAPIST

## 2018-04-10 ENCOUNTER — OFFICE VISIT (OUTPATIENT)
Dept: ORTHOPEDIC SURGERY | Age: 43
End: 2018-04-10

## 2018-04-10 VITALS
HEIGHT: 71 IN | BODY MASS INDEX: 37.81 KG/M2 | DIASTOLIC BLOOD PRESSURE: 80 MMHG | SYSTOLIC BLOOD PRESSURE: 125 MMHG | WEIGHT: 270.06 LBS | HEART RATE: 48 BPM

## 2018-04-10 DIAGNOSIS — M25.512 ACUTE PAIN OF LEFT SHOULDER: ICD-10-CM

## 2018-04-10 DIAGNOSIS — M19.012 OSTEOARTHRITIS OF LEFT SHOULDER, UNSPECIFIED OSTEOARTHRITIS TYPE: Primary | ICD-10-CM

## 2018-04-10 DIAGNOSIS — M65.812 SYNOVITIS OF LEFT SHOULDER: ICD-10-CM

## 2018-04-10 PROCEDURE — 20610 DRAIN/INJ JOINT/BURSA W/O US: CPT | Performed by: ORTHOPAEDIC SURGERY

## 2018-04-10 PROCEDURE — 99213 OFFICE O/P EST LOW 20 MIN: CPT | Performed by: ORTHOPAEDIC SURGERY
